# Patient Record
Sex: MALE | Race: WHITE | URBAN - METROPOLITAN AREA
[De-identification: names, ages, dates, MRNs, and addresses within clinical notes are randomized per-mention and may not be internally consistent; named-entity substitution may affect disease eponyms.]

---

## 2021-09-22 ENCOUNTER — CLAIMS CREATED FROM THE CLAIM WINDOW (OUTPATIENT)
Dept: URBAN - METROPOLITAN AREA MEDICAL CENTER 43 | Facility: MEDICAL CENTER | Age: 76
End: 2021-09-22
Payer: MEDICARE

## 2021-09-22 DIAGNOSIS — K74.69 OTHER CIRRHOSIS OF LIVER: ICD-10-CM

## 2021-09-22 DIAGNOSIS — R18.8 OTHER ASCITES: ICD-10-CM

## 2021-09-22 DIAGNOSIS — D69.59 OTHER SECONDARY THROMBOCYTOPENIA: ICD-10-CM

## 2021-09-22 DIAGNOSIS — R76.8 OTHER SPECIFIED ABNORMAL IMMUNOLOGICAL FINDINGS IN SERUM: ICD-10-CM

## 2021-09-22 PROCEDURE — 99222 1ST HOSP IP/OBS MODERATE 55: CPT | Performed by: INTERNAL MEDICINE

## 2021-09-22 PROCEDURE — 99221 1ST HOSP IP/OBS SF/LOW 40: CPT | Performed by: INTERNAL MEDICINE

## 2021-09-23 ENCOUNTER — CLAIMS CREATED FROM THE CLAIM WINDOW (OUTPATIENT)
Dept: URBAN - METROPOLITAN AREA MEDICAL CENTER 43 | Facility: MEDICAL CENTER | Age: 76
End: 2021-09-23
Payer: MEDICARE

## 2021-09-23 DIAGNOSIS — K74.69 OTHER CIRRHOSIS OF LIVER: ICD-10-CM

## 2021-09-23 DIAGNOSIS — R76.8 OTHER SPECIFIED ABNORMAL IMMUNOLOGICAL FINDINGS IN SERUM: ICD-10-CM

## 2021-09-23 DIAGNOSIS — R18.8 OTHER ASCITES: ICD-10-CM

## 2021-09-23 PROCEDURE — 99232 SBSQ HOSP IP/OBS MODERATE 35: CPT | Performed by: INTERNAL MEDICINE

## 2021-09-24 ENCOUNTER — CLAIMS CREATED FROM THE CLAIM WINDOW (OUTPATIENT)
Dept: URBAN - METROPOLITAN AREA MEDICAL CENTER 43 | Facility: MEDICAL CENTER | Age: 76
End: 2021-09-24
Payer: MEDICARE

## 2021-09-24 ENCOUNTER — TELEPHONE ENCOUNTER (OUTPATIENT)
Dept: URBAN - METROPOLITAN AREA CLINIC 113 | Facility: CLINIC | Age: 76
End: 2021-09-24

## 2021-09-24 DIAGNOSIS — R76.8 OTHER SPECIFIED ABNORMAL IMMUNOLOGICAL FINDINGS IN SERUM: ICD-10-CM

## 2021-09-24 DIAGNOSIS — K74.69 OTHER CIRRHOSIS OF LIVER: ICD-10-CM

## 2021-09-24 PROCEDURE — 99232 SBSQ HOSP IP/OBS MODERATE 35: CPT | Performed by: INTERNAL MEDICINE

## 2021-09-30 ENCOUNTER — OFFICE VISIT (OUTPATIENT)
Dept: URBAN - METROPOLITAN AREA CLINIC 113 | Facility: CLINIC | Age: 76
End: 2021-09-30
Payer: MEDICARE

## 2021-09-30 VITALS
SYSTOLIC BLOOD PRESSURE: 101 MMHG | WEIGHT: 171 LBS | HEART RATE: 93 BPM | HEIGHT: 65 IN | BODY MASS INDEX: 28.49 KG/M2 | DIASTOLIC BLOOD PRESSURE: 71 MMHG | TEMPERATURE: 98.5 F

## 2021-09-30 DIAGNOSIS — K74.69 CRYPTOGENIC CIRRHOSIS: ICD-10-CM

## 2021-09-30 DIAGNOSIS — R76.8 HEPATITIS C ANTIBODY TEST POSITIVE: ICD-10-CM

## 2021-09-30 DIAGNOSIS — R18.8 OTHER ASCITES: ICD-10-CM

## 2021-09-30 PROCEDURE — 99214 OFFICE O/P EST MOD 30 MIN: CPT | Performed by: INTERNAL MEDICINE

## 2021-09-30 RX ORDER — GUAIFENESIN 400 MG/1
1 TABLET AS NEEDED TABLET ORAL
Status: ACTIVE | COMMUNITY

## 2021-09-30 RX ORDER — LEVOTHYROXINE SODIUM 137 UG/1
1 CAPSULE IN THE MORNING ON AN EMPTY STOMACH CAPSULE ORAL ONCE A DAY
Status: ACTIVE | COMMUNITY

## 2021-09-30 RX ORDER — ASPIRIN 81 MG/1
1 TABLET TABLET ORAL ONCE A DAY
Status: ACTIVE | COMMUNITY

## 2021-09-30 RX ORDER — FUROSEMIDE 40 MG/1
1 TABLET TABLET ORAL ONCE A DAY
Status: ACTIVE | COMMUNITY

## 2021-09-30 RX ORDER — IPRATROPIUM BROMIDE AND ALBUTEROL SULFATE .5; 2.5 MG/3ML; MG/3ML
3 ML AS NEEDED SOLUTION RESPIRATORY (INHALATION)
Status: ACTIVE | COMMUNITY

## 2021-09-30 NOTE — EXAM-PHYSICAL EXAM
He is alert and oriented to person place and situation no acute distress. He does have mild JVD. Chest reveals diffuse crackles bilaterally. Heart reveals a regular rate and rhythm without S3 or S4 gallop. Abdomen is soft minimally distended with an umbilical hernia nontender.

## 2021-09-30 NOTE — HPI-TODAY'S VISIT:
The patient returns today for follow-up. He is a very delightful 76-year-old gentleman who I saw in the hospital last week in consultation for incidental finding of cirrhosis. The patient came in with shortness of breath which was thought to be a combination of congestive heart failure and his asbestosis. He is now requiring permanent oxygen therapy. This came on fairly rapidly. He was evaluated by cardiology and pulmonology. We performed also extensive hepatic work-up which was largely negative except for hepatitis C Mattie test which was positive. PCR was still pending upon discharge. He states that his primary care physician is repeating the hep C PCR tomorrow. There is an extensive family history of cirrhosis and that both his father and brother developed cirrhosis though the did have moderately significant alcohol consumption. Also his nephew has developed cirrhosis and does not have significant alcohol consumption. Currently his laboratory testing and imaging studies from the hospital are not in the computer. Of asked medical records to place these in there.

## 2021-12-02 ENCOUNTER — WEB ENCOUNTER (OUTPATIENT)
Dept: URBAN - METROPOLITAN AREA CLINIC 107 | Facility: CLINIC | Age: 76
End: 2021-12-02

## 2021-12-02 ENCOUNTER — DASHBOARD ENCOUNTERS (OUTPATIENT)
Age: 76
End: 2021-12-02

## 2021-12-02 ENCOUNTER — OFFICE VISIT (OUTPATIENT)
Dept: URBAN - METROPOLITAN AREA CLINIC 107 | Facility: CLINIC | Age: 76
End: 2021-12-02
Payer: MEDICARE

## 2021-12-02 VITALS
HEIGHT: 65 IN | BODY MASS INDEX: 27.16 KG/M2 | DIASTOLIC BLOOD PRESSURE: 74 MMHG | SYSTOLIC BLOOD PRESSURE: 113 MMHG | WEIGHT: 163 LBS | TEMPERATURE: 98.2 F | HEART RATE: 94 BPM | RESPIRATION RATE: 18 BRPM

## 2021-12-02 DIAGNOSIS — K74.69 CRYPTOGENIC CIRRHOSIS: ICD-10-CM

## 2021-12-02 DIAGNOSIS — R18.8 OTHER ASCITES: ICD-10-CM

## 2021-12-02 PROCEDURE — 99214 OFFICE O/P EST MOD 30 MIN: CPT | Performed by: INTERNAL MEDICINE

## 2021-12-02 RX ORDER — FUROSEMIDE 40 MG/1
1 TABLET TABLET ORAL ONCE A DAY
Status: ACTIVE | COMMUNITY

## 2021-12-02 RX ORDER — LEVOTHYROXINE SODIUM 137 UG/1
1 CAPSULE IN THE MORNING ON AN EMPTY STOMACH CAPSULE ORAL ONCE A DAY
Status: ACTIVE | COMMUNITY

## 2021-12-02 RX ORDER — GUAIFENESIN 400 MG/1
1 TABLET AS NEEDED TABLET ORAL
Status: ACTIVE | COMMUNITY

## 2021-12-02 RX ORDER — IPRATROPIUM BROMIDE AND ALBUTEROL SULFATE .5; 2.5 MG/3ML; MG/3ML
3 ML AS NEEDED SOLUTION RESPIRATORY (INHALATION)
Status: ACTIVE | COMMUNITY

## 2021-12-02 RX ORDER — ASPIRIN 81 MG/1
1 TABLET TABLET ORAL ONCE A DAY
Status: ACTIVE | COMMUNITY

## 2021-12-02 NOTE — EXAM-PHYSICAL EXAM
He is alert and oriented to person place and situation in no acute distress.  There is no asterixis today.  No scleral icterus.  Abdomen is soft and minimally distended.  There is no peripheral edema.

## 2021-12-02 NOTE — HPI-TODAY'S VISIT:
The patient returns today for follow-up. He is a very delightful 76-year-old gentleman who I saw in the hospital  in consultation for incidental finding of cirrhosis. The patient came in with shortness of breath which was thought to be a combination of congestive heart failure and his asbestosis. He is now requiring permanent oxygen therapy. This came on fairly rapidly. He was evaluated by cardiology and pulmonology. We performed also extensive hepatic work-up which was largely negative except for hepatitis C Mattie test which was positive. PCR was still pending upon discharge. He states that his primary care physician is repeating the hep C PCR tomorrow. There is an extensive family history of cirrhosis and that both his father and brother developed cirrhosis though the did have moderately significant alcohol consumption. Also his nephew has developed cirrhosis and does not have significant alcohol consumption. Currently his laboratory testing and imaging studies from the hospital are not in the computer. Of asked medical records to place these in there. Interval history.  I received records for review.  Ascites fluid cytology in October was negative.  Echocardiogram in September of this year revealed ejection fraction of 55 to 60%.  Mild to moderate pulmonary regurgitation and mild tricuspid regurgitation.  CBC from September revealed a white cell count of 6 hemoglobin of 11 MCV of 106.  Platelets of 147.  INR of 1.3.  B12 normal.  Iron studies showing iron level of 66 with a percent saturation of 39.  Ferritin was slightly elevated at 449.  Magnesium was low at 2.  Total bilirubin was 1.6.  AST was 40 ALT 16.  Antimitochondrial antibody was normal.  Alpha-1 antitrypsin level was normal.  Liver kidney microsomal enzyme was negative.  Hepatitis C test was negative.  This was a PCR.  CTA of the chest revealed asbestosis ascites and a cirrhotic appearing liver. The patient has increasing oxygen needs.  He continues to undergo pulmonary work-up.  He is going to have further testing to assess for hepatopulmonary syndrome.  His wife states that he has developed confusion at night consistent with sundowning.  She is worried about developing dementia.  She also asked a number of excellent questions in regards to signs and symptoms of hepatic encephalopathy which we reviewed at length.  His ascites is under good control currently.

## 2021-12-27 ENCOUNTER — TELEPHONE ENCOUNTER (OUTPATIENT)
Dept: URBAN - METROPOLITAN AREA CLINIC 107 | Facility: CLINIC | Age: 76
End: 2021-12-27

## 2021-12-27 ENCOUNTER — TELEPHONE ENCOUNTER (OUTPATIENT)
Dept: URBAN - METROPOLITAN AREA CLINIC 113 | Facility: CLINIC | Age: 76
End: 2021-12-27

## 2022-01-03 ENCOUNTER — TELEPHONE ENCOUNTER (OUTPATIENT)
Dept: URBAN - METROPOLITAN AREA CLINIC 113 | Facility: CLINIC | Age: 77
End: 2022-01-03

## 2022-01-03 PROBLEM — 389026000: Status: ACTIVE | Noted: 2021-09-30

## 2022-01-03 PROBLEM — 89580002: Status: ACTIVE | Noted: 2021-09-30

## 2022-02-16 ENCOUNTER — TELEPHONE ENCOUNTER (OUTPATIENT)
Dept: URBAN - METROPOLITAN AREA CLINIC 107 | Facility: CLINIC | Age: 77
End: 2022-02-16

## 2022-02-20 ENCOUNTER — TELEPHONE ENCOUNTER (OUTPATIENT)
Dept: URBAN - METROPOLITAN AREA CLINIC 113 | Facility: CLINIC | Age: 77
End: 2022-02-20

## 2022-02-20 ENCOUNTER — CLAIMS CREATED FROM THE CLAIM WINDOW (OUTPATIENT)
Dept: URBAN - METROPOLITAN AREA MEDICAL CENTER 43 | Facility: MEDICAL CENTER | Age: 77
End: 2022-02-20
Payer: MEDICARE

## 2022-02-20 DIAGNOSIS — K74.69: ICD-10-CM

## 2022-02-20 DIAGNOSIS — R18.8 OTHER ASCITES: ICD-10-CM

## 2022-02-20 DIAGNOSIS — D64.89 ANEMIA DUE TO OTHER CAUSE, NOT CLASSIFIED: ICD-10-CM

## 2022-02-20 DIAGNOSIS — R09.02 HYPOXEMIA: ICD-10-CM

## 2022-02-20 PROCEDURE — 99282 EMERGENCY DEPT VISIT SF MDM: CPT | Performed by: INTERNAL MEDICINE

## 2022-02-20 PROCEDURE — 99223 1ST HOSP IP/OBS HIGH 75: CPT | Performed by: INTERNAL MEDICINE

## 2022-02-22 ENCOUNTER — TELEPHONE ENCOUNTER (OUTPATIENT)
Dept: URBAN - METROPOLITAN AREA CLINIC 113 | Facility: CLINIC | Age: 77
End: 2022-02-22

## 2022-02-22 RX ORDER — FUROSEMIDE 40 MG/1
1 TABLET TABLET ORAL ONCE A DAY
Status: ACTIVE | COMMUNITY

## 2022-02-22 RX ORDER — LEVOTHYROXINE SODIUM 137 UG/1
1 CAPSULE IN THE MORNING ON AN EMPTY STOMACH CAPSULE ORAL ONCE A DAY
Status: ACTIVE | COMMUNITY

## 2022-02-22 RX ORDER — ASPIRIN 81 MG/1
1 TABLET TABLET ORAL ONCE A DAY
Status: ACTIVE | COMMUNITY

## 2022-02-22 RX ORDER — FUROSEMIDE 40 MG/1
1 TABLET TABLET ORAL ONCE A DAY
Qty: 30 | Refills: 1 | OUTPATIENT
Start: 2022-02-22

## 2022-02-22 RX ORDER — IPRATROPIUM BROMIDE AND ALBUTEROL SULFATE .5; 2.5 MG/3ML; MG/3ML
3 ML AS NEEDED SOLUTION RESPIRATORY (INHALATION)
Status: ACTIVE | COMMUNITY

## 2022-02-22 RX ORDER — SPIRONOLACTONE 100 MG/1
1 TABLET TABLET, FILM COATED ORAL ONCE A DAY
Qty: 30 | Refills: 1 | OUTPATIENT
Start: 2022-02-22 | End: 2022-04-23

## 2022-02-22 RX ORDER — GUAIFENESIN 400 MG/1
1 TABLET AS NEEDED TABLET ORAL
Status: ACTIVE | COMMUNITY

## 2022-03-04 ENCOUNTER — OFFICE VISIT (OUTPATIENT)
Dept: URBAN - METROPOLITAN AREA CLINIC 107 | Facility: CLINIC | Age: 77
End: 2022-03-04

## 2022-03-04 NOTE — HPI-OTHER HISTORIES
Hospital records review 9/2021: Ascites fluid cytology in October was negative.  Echocardiogram in September of this year revealed ejection fraction of 55 to 60%.  Mild to moderate pulmonary regurgitation and mild tricuspid regurgitation.  CBC from September revealed a white cell count of 6 hemoglobin of 11 MCV of 106.  Platelets of 147.  INR of 1.3.  B12 normal.  Iron studies showing iron level of 66 with a percent saturation of 39.  Ferritin was slightly elevated at 449.  Magnesium was low at 2.  Total bilirubin was 1.6.  AST was 40 ALT 16.  Antimitochondrial antibody was normal.  Alpha-1 antitrypsin level was normal.  Liver kidney microsomal enzyme was negative.  Hepatitis C test was negative.  This was a PCR.  CTA of the chest revealed asbestosis ascites and a cirrhotic appearing liver.

## 2022-03-04 NOTE — HPI-TODAY'S VISIT:
The patient returns today for follow-up. He is a very delightful 76-year-old gentleman who I saw in the hospital  in consultation for incidental finding of cirrhosis. The patient came in with shortness of breath which was thought to be a combination of congestive heart failure and his asbestosis. He is now requiring permanent oxygen therapy. This came on fairly rapidly. He was evaluated by cardiology and pulmonology. We performed also extensive hepatic work-up which was largely negative except for hepatitis C Mattie test which was positive. PCR was still pending upon discharge. He states that his primary care physician is repeating the hep C PCR tomorrow. There is an extensive family history of cirrhosis and that both his father and brother developed cirrhosis though the did have moderately significant alcohol consumption. Also his nephew has developed cirrhosis and does not have significant alcohol consumption. Currently his laboratory testing and imaging studies from the hospital are not in the computer. Of asked medical records to place these in there.  Interval history.  The patient has increasing oxygen needs.  He continues to undergo pulmonary work-up.  He is going to have further testing to assess for hepatopulmonary syndrome.  His wife states that he has developed confusion at night consistent with sundowning.  She is worried about developing dementia.  She also asked a number of excellent questions in regards to signs and symptoms of hepatic encephalopathy which we reviewed at length.  His ascites is under good control currently.   Interval history:  He was last seen in the office on 12/2/2021 at which time his ascites was under control on Lasix 40 mg once daily. He was not considered a candidate for liver transplant given his current pulmonary constraints, developing dementia and age. He was undergoing an extensive pulmonary work-up for possible hepatopulmonary syndrome. If this proved to be the case, Dr. Simpson planned to refer to hepatology for second opinion and possibe treatment options.  Patient [place] [facility] [place] per Dr. Simpson recommendation for increased abdominal swelling as well as feet swelling.  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  He was previously managed with Lasix but it was unclear as to why he had stopped this medication.  And marked abdominal distention at the time however no respiratory compromise.  He denies any pulmonary complaints, alcohol use.  Labs that day revealed elevated TB 1.5, elevated AST of 51, normal ALT, normal alkaline phosphatase, normal total protein, low albumin of 2.3.  He was consulted by Dr. Patricio and was planned for a large-volume paracentesis.  He was to restart Lasix 40 mg once daily and Aldactone 100 mg daily and was safe to discharge after paracentesis.  3.4 L of ascitic fluid were removed during paracentesis that day.

## 2022-04-01 ENCOUNTER — OFFICE VISIT (OUTPATIENT)
Dept: URBAN - METROPOLITAN AREA CLINIC 107 | Facility: CLINIC | Age: 77
End: 2022-04-01